# Patient Record
Sex: FEMALE | Race: BLACK OR AFRICAN AMERICAN
[De-identification: names, ages, dates, MRNs, and addresses within clinical notes are randomized per-mention and may not be internally consistent; named-entity substitution may affect disease eponyms.]

---

## 2021-03-24 ENCOUNTER — HOSPITAL ENCOUNTER (EMERGENCY)
Dept: HOSPITAL 35 - ER | Age: 26
Discharge: HOME | End: 2021-03-24
Payer: COMMERCIAL

## 2021-03-24 VITALS — WEIGHT: 220 LBS | HEIGHT: 65 IN | BODY MASS INDEX: 36.65 KG/M2

## 2021-03-24 VITALS — SYSTOLIC BLOOD PRESSURE: 137 MMHG | DIASTOLIC BLOOD PRESSURE: 81 MMHG

## 2021-03-24 DIAGNOSIS — S80.01XA: Primary | ICD-10-CM

## 2021-03-24 DIAGNOSIS — Z91.018: ICD-10-CM

## 2021-03-24 DIAGNOSIS — Z79.899: ICD-10-CM
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
## 2021-03-24 DIAGNOSIS — Z88.8: ICD-10-CM

## 2021-03-24 DIAGNOSIS — Z91.013: ICD-10-CM
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
## 2021-03-24 DIAGNOSIS — J45.909: ICD-10-CM

## 2021-03-24 DIAGNOSIS — G43.909: ICD-10-CM

## 2021-03-24 DIAGNOSIS — K21.9: ICD-10-CM
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
## 2021-04-30 ENCOUNTER — HOSPITAL ENCOUNTER (EMERGENCY)
Dept: HOSPITAL 35 - ER | Age: 26
Discharge: HOME | End: 2021-04-30
Payer: COMMERCIAL

## 2021-04-30 VITALS — DIASTOLIC BLOOD PRESSURE: 68 MMHG | SYSTOLIC BLOOD PRESSURE: 132 MMHG

## 2021-04-30 VITALS — WEIGHT: 228 LBS | BODY MASS INDEX: 37.99 KG/M2 | HEIGHT: 65 IN

## 2021-04-30 DIAGNOSIS — Z88.6: ICD-10-CM

## 2021-04-30 DIAGNOSIS — Z91.013: ICD-10-CM

## 2021-04-30 DIAGNOSIS — J45.909: ICD-10-CM

## 2021-04-30 DIAGNOSIS — K21.9: ICD-10-CM

## 2021-04-30 DIAGNOSIS — G43.909: ICD-10-CM

## 2021-04-30 DIAGNOSIS — Z91.018: ICD-10-CM

## 2021-04-30 DIAGNOSIS — R07.89: Primary | ICD-10-CM

## 2021-04-30 LAB
ALBUMIN SERPL-MCNC: 3.6 G/DL (ref 3.4–5)
ALT SERPL-CCNC: 20 U/L (ref 14–59)
ANION GAP SERPL CALC-SCNC: 8 MMOL/L (ref 7–16)
AST SERPL-CCNC: 16 U/L (ref 15–37)
BASOPHILS NFR BLD AUTO: 0.9 % (ref 0–2)
BILIRUB SERPL-MCNC: 0.2 MG/DL (ref 0.2–1)
BUN SERPL-MCNC: 11 MG/DL (ref 7–18)
CALCIUM SERPL-MCNC: 9.2 MG/DL (ref 8.5–10.1)
CHLORIDE SERPL-SCNC: 104 MMOL/L (ref 98–107)
CO2 SERPL-SCNC: 27 MMOL/L (ref 21–32)
CREAT SERPL-MCNC: 0.7 MG/DL (ref 0.6–1)
EOSINOPHIL NFR BLD: 4 % (ref 0–3)
ERYTHROCYTE [DISTWIDTH] IN BLOOD BY AUTOMATED COUNT: 13.8 % (ref 10.5–14.5)
GLUCOSE SERPL-MCNC: 107 MG/DL (ref 74–106)
GRANULOCYTES NFR BLD MANUAL: 45.8 % (ref 36–66)
HCT VFR BLD CALC: 38 % (ref 37–47)
HGB BLD-MCNC: 12.5 GM/DL (ref 12–15)
LIPASE: 132 U/L (ref 73–393)
LYMPHOCYTES NFR BLD AUTO: 45.4 % (ref 24–44)
MCH RBC QN AUTO: 27.4 PG (ref 26–34)
MCHC RBC AUTO-ENTMCNC: 33 G/DL (ref 28–37)
MCV RBC: 83.1 FL (ref 80–100)
MONOCYTES NFR BLD: 3.9 % (ref 1–8)
NEUTROPHILS # BLD: 1.8 THOU/UL (ref 1.4–8.2)
PLATELET # BLD: 306 THOU/UL (ref 150–400)
POTASSIUM SERPL-SCNC: 4 MMOL/L (ref 3.5–5.1)
PROT SERPL-MCNC: 7.9 G/DL (ref 6.4–8.2)
RBC # BLD AUTO: 4.57 MIL/UL (ref 4.2–5)
SODIUM SERPL-SCNC: 139 MMOL/L (ref 136–145)
TROPONIN I SERPL-MCNC: <0.06 NG/ML (ref ?–0.06)
WBC # BLD AUTO: 3.9 THOU/UL (ref 4–11)

## 2021-05-03 NOTE — EKG
[place] [address] [facility] [place]  [address]
Phone:  [phone]                    ELECTROCARDIOGRAM REPORT      
_______________________________________________________________________________
 
Name:       FANNY FULTON        Room #:                     DEP   
MOHIT#:      [id number]     Account #:      [id number]  
Admission:  21    Attend Phys:                          
Discharge:  21    Date of Birth:  [birth year]  
                                                          Report #: [id number]
   [id number]
_______________________________________________________________________________
                         [facility] ED
                                       
Test Date:    2021               Test Time:    11:46:10
Pat Name:     FANNY FULTON         Department:   
Patient ID:   [id number]            Room:          
Gender:       F                        Technician:   unknown
:          1995               Requested By: Sam Naidu
Order Number: [id number] MD:   Richardson Isbell
                                 Measurements
Intervals                              Axis          
Rate:         60                       P:            24
ME:           167                      QRS:          43
QRSD:         93                       T:            25
QT:           413                                    
QTc:          413                                    
                           Interpretive Statements
Sinus rhythm
Compared to ECG 2019 19:11:51
T-wave abnormality no longer present
Electronically Signed On 5-3-2021 7:01:43 CDT by Richardson Isbell
[email or web address]
 
 
 
 
 
 
 
 
 
 
 
 
 
 
 
 
 
 
 
 
 
 
  <ELECTRONICALLY SIGNED>
   By: Richardson Isbell MD, [facility]    
  21     0701
D: 21 1146                           _____________________________________
T: 21 1146                           Richardson Isbell MD, FACC      /EPI